# Patient Record
Sex: FEMALE | URBAN - METROPOLITAN AREA
[De-identification: names, ages, dates, MRNs, and addresses within clinical notes are randomized per-mention and may not be internally consistent; named-entity substitution may affect disease eponyms.]

---

## 2023-02-27 ENCOUNTER — APPOINTMENT (OUTPATIENT)
Dept: URBAN - METROPOLITAN AREA CLINIC 206 | Age: 23
Setting detail: DERMATOLOGY
End: 2023-02-28

## 2023-02-27 DIAGNOSIS — D485 NEOPLASM OF UNCERTAIN BEHAVIOR OF SKIN: ICD-10-CM

## 2023-02-27 PROBLEM — D48.5 NEOPLASM OF UNCERTAIN BEHAVIOR OF SKIN: Status: ACTIVE | Noted: 2023-02-27

## 2023-02-27 PROCEDURE — OTHER MIPS QUALITY: OTHER

## 2023-02-27 PROCEDURE — OTHER OTHER: OTHER

## 2023-02-27 PROCEDURE — 11300 SHAVE SKIN LESION 0.5 CM/<: CPT

## 2023-02-27 PROCEDURE — OTHER SHAVE REMOVAL: OTHER

## 2023-02-27 ASSESSMENT — LOCATION ZONE DERM: LOCATION ZONE: AXILLAE

## 2023-02-27 ASSESSMENT — LOCATION SIMPLE DESCRIPTION DERM: LOCATION SIMPLE: LEFT ANTERIOR AXILLA

## 2023-02-27 ASSESSMENT — LOCATION DETAILED DESCRIPTION DERM: LOCATION DETAILED: LEFT ANTERIOR AXILLA

## 2023-02-27 NOTE — PROCEDURE: OTHER
Note Text (......Xxx Chief Complaint.): This diagnosis correlates with the
Render Risk Assessment In Note?: no
Detail Level: Zone
Other (Free Text): Photo in chart

## 2023-02-27 NOTE — PROCEDURE: MIPS QUALITY
Detail Level: Detailed
Quality 226: Preventive Care And Screening: Tobacco Use: Screening And Cessation Intervention: Patient screened for tobacco use, is a smoker AND received Cessation Counseling within measurement period or in the six months prior to the measurement period

## 2023-02-27 NOTE — HPI: SKIN LESION
How Severe Is Your Skin Lesion?: moderate
Is This A New Presentation, Or A Follow-Up?: Skin Lesion
Additional History: Pt states she has had mole since she was young but believes it has changed color and gotten bigger.

## 2023-02-27 NOTE — PROCEDURE: SHAVE REMOVAL
Post-Care Instructions: Wash gently with a mild soap, pat dry and apply either Vaseline/Aquaphor daily. Avoid using Neosporin. Keep biopsy sites covered with ointment and a bandage to avoid letting a scab form.  Covered, moist wounds will heal faster. Continue to keep biopsy site covered with ointment and a bandage for 7-10 days (or until completely healed). Biopsies on the lower leg will take longer to heal. It is normal to have some mild discomfort after a biopsy. That should resolve within a few days. You can take OTC Tylenol or Advil if needed. Follow directions on bottle. If pain worsens, call the office \\n\\nIf you experience bleeding, apply firm and steady pressure for 15 minutes. Do not stop to check to see if the wound is still bleeding.  If the wound continues to bleed after 15-20 minutes, call the office.\\n\\nSigns and symptoms of infections are worsening pain, warmth, drainages with pus, yellow or prasad crusts, or fever/chills. If you develop any of these symptoms, please call the office. \\n\\nAfter the wound has healed, it is important to protect are from sun to avoid red or brown pigmentation.  Applying sunscreen (SPF 30 or higher) and/or wearing sun protected clothing will help the scar fade faster.  \\n\\n[facility] phone number: [phone] Post-Care Instructions: Wash gently with a mild soap, pat dry and apply either Vaseline/Aquaphor daily. Avoid using Neosporin. Keep biopsy sites covered with ointment and a bandage to avoid letting a scab form.  Covered, moist wounds will heal faster. Continue to keep biopsy site covered with ointment and a bandage for 7-10 days (or until completely healed). Biopsies on the lower leg will take longer to heal. It is normal to have some mild discomfort after a biopsy. That should resolve within a few days. You can take OTC Tylenol or Advil if needed. Follow directions on bottle. If pain worsens, call the office \\n\\nIf you experience bleeding, apply firm and steady pressure for 15 minutes. Do not stop to check to see if the wound is still bleeding.  If the wound continues to bleed after 15-20 minutes, call the office.\\n\\nSigns and symptoms of infections are worsening pain, warmth, drainages with pus, yellow or prasad crusts, or fever/chills. If you develop any of these symptoms, please call the office. \\n\\nAfter the wound has healed, it is important to protect are from sun to avoid red or brown pigmentation.  Applying sunscreen (SPF 30 or higher) and/or wearing sun protected clothing will help the scar fade faster.  \\n\\n[facility] phone number: [phone]